# Patient Record
Sex: MALE | Race: WHITE | Employment: OTHER | URBAN - NONMETROPOLITAN AREA
[De-identification: names, ages, dates, MRNs, and addresses within clinical notes are randomized per-mention and may not be internally consistent; named-entity substitution may affect disease eponyms.]

---

## 2017-01-04 ENCOUNTER — TELEPHONE (OUTPATIENT)
Dept: CARDIOLOGY | Age: 62
End: 2017-01-04

## 2017-01-04 RX ORDER — AMIODARONE HYDROCHLORIDE 200 MG/1
200 TABLET ORAL DAILY
Qty: 30 TABLET | Refills: 6 | Status: CANCELLED | OUTPATIENT
Start: 2017-01-04

## 2017-01-06 RX ORDER — AMIODARONE HYDROCHLORIDE 200 MG/1
200 TABLET ORAL DAILY
Qty: 30 TABLET | Refills: 6 | Status: SHIPPED | OUTPATIENT
Start: 2017-01-06

## 2017-01-31 ENCOUNTER — OFFICE VISIT (OUTPATIENT)
Dept: CARDIOLOGY | Age: 62
End: 2017-01-31

## 2017-01-31 VITALS
SYSTOLIC BLOOD PRESSURE: 110 MMHG | HEART RATE: 71 BPM | WEIGHT: 179.8 LBS | HEIGHT: 71 IN | RESPIRATION RATE: 16 BRPM | BODY MASS INDEX: 25.17 KG/M2 | DIASTOLIC BLOOD PRESSURE: 68 MMHG

## 2017-01-31 DIAGNOSIS — I42.8 CARDIOMYOPATHY, NONISCHEMIC (HCC): ICD-10-CM

## 2017-01-31 DIAGNOSIS — I25.10 CORONARY ARTERY DISEASE INVOLVING NATIVE CORONARY ARTERY OF NATIVE HEART WITHOUT ANGINA PECTORIS: Primary | ICD-10-CM

## 2017-01-31 DIAGNOSIS — I48.0 PAF (PAROXYSMAL ATRIAL FIBRILLATION) (HCC): ICD-10-CM

## 2017-01-31 PROCEDURE — 93000 ELECTROCARDIOGRAM COMPLETE: CPT | Performed by: INTERNAL MEDICINE

## 2017-01-31 PROCEDURE — 99213 OFFICE O/P EST LOW 20 MIN: CPT | Performed by: INTERNAL MEDICINE

## 2017-02-15 ENCOUNTER — PROCEDURE VISIT (OUTPATIENT)
Dept: CARDIOLOGY | Age: 62
End: 2017-02-15

## 2017-02-15 DIAGNOSIS — Z95.0 CARDIAC PACEMAKER IN SITU: Primary | ICD-10-CM

## 2017-02-15 DIAGNOSIS — I49.5 SSS (SICK SINUS SYNDROME) (HCC): ICD-10-CM

## 2017-02-15 PROCEDURE — 93288 INTERROG EVL PM/LDLS PM IP: CPT | Performed by: INTERNAL MEDICINE

## 2017-04-20 ENCOUNTER — PROCEDURE VISIT (OUTPATIENT)
Dept: CARDIOLOGY CLINIC | Age: 62
End: 2017-04-20
Payer: MEDICARE

## 2017-04-20 DIAGNOSIS — I42.9 CARDIOMYOPATHY (HCC): Primary | ICD-10-CM

## 2017-04-20 LAB
LEFT VENTRICULAR EJECTION FRACTION HIGH VALUE: NORMAL %
LEFT VENTRICULAR EJECTION FRACTION MODE: NORMAL
LV EF: 30 %
LV EF: 33 %
LVEF MODALITY: NORMAL

## 2017-04-20 PROCEDURE — 93306 TTE W/DOPPLER COMPLETE: CPT | Performed by: INTERNAL MEDICINE

## 2017-04-21 ENCOUNTER — TELEPHONE (OUTPATIENT)
Dept: CARDIOLOGY | Age: 62
End: 2017-04-21

## 2017-04-21 DIAGNOSIS — I42.8 CARDIOMYOPATHY, NONISCHEMIC (HCC): ICD-10-CM

## 2017-04-24 DIAGNOSIS — I42.8 CARDIOMYOPATHY, NONISCHEMIC (HCC): Primary | ICD-10-CM

## 2017-05-01 DIAGNOSIS — I42.9 CARDIOMYOPATHY (HCC): Primary | ICD-10-CM

## 2017-08-11 DIAGNOSIS — E03.9 HYPOTHYROIDISM, UNSPECIFIED TYPE: ICD-10-CM

## 2017-08-14 RX ORDER — LEVOTHYROXINE SODIUM 0.07 MG/1
TABLET ORAL
Qty: 30 TABLET | Refills: 0 | Status: SHIPPED | OUTPATIENT
Start: 2017-08-14

## 2017-08-14 RX ORDER — LEVOTHYROXINE SODIUM 88 UG/1
88 TABLET ORAL DAILY
Qty: 30 TABLET | Refills: 0 | Status: SHIPPED | OUTPATIENT
Start: 2017-08-14

## 2024-02-24 ENCOUNTER — CLINICAL DOCUMENTATION (OUTPATIENT)
Age: 69
End: 2024-02-24

## 2024-03-08 ENCOUNTER — OFFICE VISIT (OUTPATIENT)
Age: 69
End: 2024-03-08

## 2024-03-08 VITALS
RESPIRATION RATE: 18 BRPM | WEIGHT: 204.8 LBS | HEART RATE: 86 BPM | SYSTOLIC BLOOD PRESSURE: 104 MMHG | DIASTOLIC BLOOD PRESSURE: 64 MMHG | HEIGHT: 69 IN | BODY MASS INDEX: 30.33 KG/M2

## 2024-03-08 DIAGNOSIS — R89.9 ABNORMAL LABORATORY TEST RESULT: ICD-10-CM

## 2024-03-08 DIAGNOSIS — E03.9 ACQUIRED HYPOTHYROIDISM: Primary | ICD-10-CM

## 2024-03-08 DIAGNOSIS — N18.9 CHRONIC KIDNEY DISEASE, UNSPECIFIED CKD STAGE: ICD-10-CM

## 2024-03-08 PROCEDURE — 99214 OFFICE O/P EST MOD 30 MIN: CPT | Performed by: INTERNAL MEDICINE

## 2024-03-08 PROCEDURE — 1123F ACP DISCUSS/DSCN MKR DOCD: CPT | Performed by: INTERNAL MEDICINE

## 2024-03-08 RX ORDER — RAMIPRIL 2.5 MG/1
CAPSULE ORAL
COMMUNITY
Start: 2024-02-14

## 2024-03-08 RX ORDER — LEVOTHYROXINE SODIUM 137 UG/1
TABLET ORAL
Qty: 100 TABLET | Refills: 1 | Status: SHIPPED | OUTPATIENT
Start: 2024-03-08

## 2024-03-08 NOTE — PROGRESS NOTES
(1 of 2) Never done    Respiratory Syncytial Virus (RSV) Pregnant or age 60 yrs+ (1 - 1-dose 60+ series) Never done    Diabetes screen  08/06/2018    Colorectal Cancer Screen  09/01/2019    AAA screen  01/14/2020    Lipids  07/19/2023    Flu vaccine (1) 08/01/2023    COVID-19 Vaccine (3 - 2023-24 season) 09/01/2023    Annual Wellness Visit (Medicare Advantage)  Never done    GFR test (Diabetes, CKD 3-4, OR last GFR 15-59)  03/02/2024    Pneumococcal 65+ years Vaccine  Completed    Hepatitis A vaccine  Aged Out    Hepatitis B vaccine  Aged Out    Hib vaccine  Aged Out    Polio vaccine  Aged Out    Meningococcal (ACWY) vaccine  Aged Out         Review of Systems    Constitutional: negative for chills and fevers  Eyes: negative for irritation, redness and visual disturbance  Respiratory: negative for cough, shortness of breath and wheezing  Cardiovascular: negative for chest pain, irregular heart beat and palpitations   The remainder of systems were reviewed and negative.       Objective:    /64 (Site: Left Upper Arm, Position: Sitting, Cuff Size: Medium Adult)   Pulse 86   Resp 18   Ht 1.753 m (5' 9\")   Wt 92.9 kg (204 lb 12.8 oz)   BMI 30.24 kg/m²   Body surface area is 2.13 meters squared.      Physical Exam   General: alert, appears stated age, cooperative and no distress   Eyes: negative findings: lids and lashes normal, conjunctivae and sclerae normal, corneas clear and pupils equal, round, reactive to light and accomodation  Neck:no adenopathy, no carotid bruit, no JVD and supple, symmetrical, trachea midline  Thyroid: There is no goiter or thyroid tenderness.  Lung:clear to auscultation bilaterally  Heart: regular rate and rhythm, S1, S2 normal, no murmur, click, rub or gallop and normal apical impulse  Abdomen:soft, non-tender; bowel sounds normal; no masses,  no organomegaly  Extremities:extremities normal, atraumatic, no cyanosis or edema and Homans sign is negative, no sign of DVT  Pulses:2+ and

## 2024-03-11 LAB
ANION GAP SERPL CALCULATED.3IONS-SCNC: 6 MMOL/L (ref 4–12)
BUN BLDV-MCNC: 19 MG/DL (ref 7–20)
CALCIUM SERPL-MCNC: 9.6 MG/DL (ref 8.8–10.5)
CHLORIDE BLD-SCNC: 103 MEQ/L (ref 101–111)
CO2: 29 MEQ/L (ref 21–32)
CREAT SERPL-MCNC: 1.65 MG/DL (ref 0.6–1.3)
CREATININE CLEARANCE: 42
GLUCOSE: 108 MG/DL (ref 70–110)
POTASSIUM SERPL-SCNC: 4.6 MEQ/L (ref 3.6–5)
SODIUM BLD-SCNC: 138 MEQ/L (ref 135–145)
T4 FREE: 1.07 NG/DL (ref 0.61–1.12)
TSH SERPL DL<=0.05 MIU/L-ACNC: 5.11 MCIU/ML (ref 0.49–4.67)

## 2024-03-15 LAB — MISCELLANEOUS LAB TEST ORDER: ABNORMAL

## 2024-03-17 ENCOUNTER — CLINICAL DOCUMENTATION (OUTPATIENT)
Age: 69
End: 2024-03-17

## 2024-10-07 DIAGNOSIS — E03.9 ACQUIRED HYPOTHYROIDISM: ICD-10-CM

## 2024-10-07 RX ORDER — LEVOTHYROXINE SODIUM 137 UG/1
TABLET ORAL
Qty: 100 TABLET | Refills: 0 | Status: SHIPPED | OUTPATIENT
Start: 2024-10-07 | End: 2024-10-10 | Stop reason: SDUPTHER

## 2024-10-07 RX ORDER — LEVOTHYROXINE SODIUM 137 UG/1
TABLET ORAL
Qty: 100 TABLET | Refills: 1 | Status: CANCELLED | OUTPATIENT
Start: 2024-10-07

## 2024-10-10 DIAGNOSIS — E03.9 ACQUIRED HYPOTHYROIDISM: ICD-10-CM

## 2024-10-10 RX ORDER — LEVOTHYROXINE SODIUM 137 UG/1
TABLET ORAL
Qty: 100 TABLET | Refills: 1 | Status: SHIPPED | OUTPATIENT
Start: 2024-10-10

## 2025-01-21 ENCOUNTER — OFFICE VISIT (OUTPATIENT)
Age: 70
End: 2025-01-21

## 2025-01-21 VITALS
HEIGHT: 69 IN | DIASTOLIC BLOOD PRESSURE: 80 MMHG | WEIGHT: 194.8 LBS | RESPIRATION RATE: 16 BRPM | HEART RATE: 54 BPM | SYSTOLIC BLOOD PRESSURE: 122 MMHG | BODY MASS INDEX: 28.85 KG/M2

## 2025-01-21 DIAGNOSIS — E03.9 ACQUIRED HYPOTHYROIDISM: Primary | ICD-10-CM

## 2025-01-21 DIAGNOSIS — N18.9 CHRONIC KIDNEY DISEASE, UNSPECIFIED CKD STAGE: ICD-10-CM

## 2025-01-21 DIAGNOSIS — R89.9 ABNORMAL LABORATORY TEST RESULT: ICD-10-CM

## 2025-01-21 LAB
ANION GAP SERPL CALCULATED.3IONS-SCNC: 10 MMOL/L (ref 4–12)
ANION GAP, EXTERNAL: 10
BUN BLDV-MCNC: 20 MG/DL (ref 7–20)
BUN, EXTERNAL: 20
BUN/CREATININE RATIO, EXTERNAL: ABNORMAL
CALCIUM SERPL-MCNC: 9.3 MG/DL (ref 8.8–10.5)
CALCIUM, EXTERNAL: 9.3
CHLORIDE BLD-SCNC: 105 MEQ/L (ref 101–111)
CHLORIDE, EXTERNAL: 105
CO2, EXTERNAL: 24
CO2: 24 MEQ/L (ref 21–32)
CREAT SERPL-MCNC: 1.46 MG/DL (ref 0.6–1.3)
CREATININE CLEARANCE: 48
CREATININE, EXTERNAL: 1.46
EGFR IF AFA, EXTERNAL: ABNORMAL
EGFR IF NONAFRICAN AMERICAN: 48
GLUCOSE SER, EXTERNAL: 117
GLUCOSE: 117 MG/DL (ref 70–110)
POTASSIUM SERPL-SCNC: 5 MEQ/L (ref 3.6–5)
POTASSIUM, EXTERNAL: 5
SODIUM BLD-SCNC: 139 MEQ/L (ref 135–145)
SODIUM, EXTERNAL: 139
T4 FREE: 1.23
T4 FREE: 1.23 NG/DL (ref 0.61–1.12)
TSH SERPL DL<=0.05 MIU/L-ACNC: 1 MCIU/ML (ref 0.49–4.67)
TSH SERPL DL<=0.05 MIU/L-ACNC: 1 UIU/ML

## 2025-01-21 PROCEDURE — 1123F ACP DISCUSS/DSCN MKR DOCD: CPT | Performed by: INTERNAL MEDICINE

## 2025-01-21 PROCEDURE — 1159F MED LIST DOCD IN RCRD: CPT | Performed by: INTERNAL MEDICINE

## 2025-01-21 PROCEDURE — 1160F RVW MEDS BY RX/DR IN RCRD: CPT | Performed by: INTERNAL MEDICINE

## 2025-01-21 PROCEDURE — 99214 OFFICE O/P EST MOD 30 MIN: CPT | Performed by: INTERNAL MEDICINE

## 2025-01-21 RX ORDER — LEVOTHYROXINE SODIUM 137 UG/1
TABLET ORAL
Qty: 100 TABLET | Refills: 1 | Status: SHIPPED | OUTPATIENT
Start: 2025-01-21

## 2025-01-21 NOTE — PROGRESS NOTES
[facility] [place] [facility]  [facility] ENDOCRINOLOGY  [address]  [place] [address]  Dept: [phone]  Loc: [phone]     Visit Date: 1/21/2025    Michael Jaime is a 70 y.o. male who presents today for:  Chief Complaint   Patient presents with    Follow-up            Subjective:      HPI     Michael Jaime is a 70 y.o. , male who comes for f/u for hypothyroidism.  PCP:Yessenia Carlson DO  This patient was diagnosed with hypothyroidism about 5 years ago.   Etiology of hypothyroidism is Unknown. Current therapy includes 137 mcg of synthroid daily for 6 days and 1.5 tablets once a week.  Patient has in the past tested positive for TSH alpha subunit although CT scan of the head did not show any pituitary lesions, however this was done without contrast.  The patient did not follow-up/recommended at the last visit due to family obligations.  He has not had recent thyroid levels.   Symptoms are as follows: Fatigue.   Patient reports intentional weight loss.  Patient reports he has been having leg cramps over the last month.  Patient also reports GI issues and back pain that are currently being followed by specialists.  Patient denies headaches or vision changes.      Past Medical History:   Diagnosis Date    Atrial fibrillation (HCC)     CAD (coronary artery disease)     Cardiomyopathy (HCC)     Echocardiogram abnormal 11/17/2016    EF 25%    HTN (hypertension)     Hyperlipidemia     Hypothyroidism     SSS (sick sinus syndrome) (HCC)     Symptomatic varicose veins     Tobacco abuse     Venous insufficiency     CHRONIC      Past Surgical History:   Procedure Laterality Date    CARDIAC CATHETERIZATION  12/01/2016    NML CORS EF 25%    CARDIAC PACEMAKER PLACEMENT  2009    COLONOSCOPY  09/01/2009    COLONOSCOPY  10/20/2016    sigmoid diverticulosis, hemorrhoids, no bx    HEMORRHOID SURGERY      OTHER SURGICAL HISTORY      laser ablation and stab phlebectomy 10/24/16    PACEMAKER PLACEMENT

## 2025-01-21 NOTE — PROGRESS NOTES
[facility] [place] [facility]  [facility] ENDOCRINOLOGY  [address]  [place] [address]  Dept: [phone]  Loc: [phone]     Visit Date: 1/20/2025    Michael Jaime is a 70 y.o. male who presents today for:  No chief complaint on file.           Subjective:      HPI     Michael Jaime is a 70 y.o. , male who comes for f/u for hypothyroidism.  PCP:Yessenia Carlson DO  This patient was diagnosed with hypothyroidism about 5 years ago.   Etiology of hypothyroidism is Unknown. Current therapy includes 137 mcg of synthroid daily for 6 days and 1.5 tablets once a week.  Patient has in the past tested positive for TSH alpha subunit although CT scan of the head did not show any pituitary lesions.  The patient did not follow-up/recommended at the last visit.  He has not had recent thyroid levels.   Symptoms are as follows: {APEX Hyperthyroidism:41346}.     Past Medical History:   Diagnosis Date    Atrial fibrillation (HCC)     CAD (coronary artery disease)     Cardiomyopathy (HCC)     Echocardiogram abnormal 11/17/2016    EF 25%    HTN (hypertension)     Hyperlipidemia     Hypothyroidism     SSS (sick sinus syndrome) (HCC)     Symptomatic varicose veins     Tobacco abuse     Venous insufficiency     CHRONIC      Past Surgical History:   Procedure Laterality Date    CARDIAC CATHETERIZATION  12/01/2016    NML CORS EF 25%    CARDIAC PACEMAKER PLACEMENT  2009    COLONOSCOPY  09/01/2009    COLONOSCOPY  10/20/2016    sigmoid diverticulosis, hemorrhoids, no bx    HEMORRHOID SURGERY      OTHER SURGICAL HISTORY      laser ablation and stab phlebectomy 10/24/16    PACEMAKER PLACEMENT         Family History   Problem Relation Age of Onset    Cancer Father         lung cancer    Colon Cancer Neg Hx      Social History     Tobacco Use    Smoking status: Every Day     Current packs/day: 0.25     Types: Cigarettes    Smokeless tobacco: Not on file   Substance Use Topics    Alcohol use: No      Current  Please review

## 2025-01-24 LAB — MISCELLANEOUS LAB TEST ORDER: ABNORMAL

## 2025-01-25 NOTE — RESULT ENCOUNTER NOTE
Michael Jaime  lab test(s) were abnormal.  We need to arrange pituitary imaging with contrast.  Please contact patient and document response.

## 2025-01-27 ENCOUNTER — TELEPHONE (OUTPATIENT)
Age: 70
End: 2025-01-27

## 2025-01-27 NOTE — TELEPHONE ENCOUNTER
----- Message from Dr. Maurilio Daly MD sent at 1/25/2025  2:19 PM EST -----  Michael Jaime  lab test(s) were abnormal.  We need to arrange pituitary imaging with contrast.  Please contact patient and document response.

## 2025-04-10 ENCOUNTER — CLINICAL DOCUMENTATION (OUTPATIENT)
Age: 70
End: 2025-04-10

## 2025-04-10 DIAGNOSIS — R89.9 ABNORMAL LABORATORY TEST RESULT: Primary | ICD-10-CM

## 2025-04-16 LAB
CREAT SERPL-MCNC: 1.41 MG/DL (ref 0.6–1.3)
CREATININE CLEARANCE: 50

## 2025-04-23 ENCOUNTER — OFFICE VISIT (OUTPATIENT)
Age: 70
End: 2025-04-23

## 2025-04-23 VITALS
HEIGHT: 69 IN | SYSTOLIC BLOOD PRESSURE: 104 MMHG | DIASTOLIC BLOOD PRESSURE: 72 MMHG | HEART RATE: 86 BPM | BODY MASS INDEX: 28.44 KG/M2 | WEIGHT: 192 LBS

## 2025-04-23 DIAGNOSIS — R89.9 ABNORMAL LABORATORY TEST RESULT: ICD-10-CM

## 2025-04-23 DIAGNOSIS — N18.9 CHRONIC KIDNEY DISEASE, UNSPECIFIED CKD STAGE: ICD-10-CM

## 2025-04-23 DIAGNOSIS — E03.9 ACQUIRED HYPOTHYROIDISM: Primary | ICD-10-CM

## 2025-04-23 PROCEDURE — 1160F RVW MEDS BY RX/DR IN RCRD: CPT | Performed by: INTERNAL MEDICINE

## 2025-04-23 PROCEDURE — 99214 OFFICE O/P EST MOD 30 MIN: CPT | Performed by: INTERNAL MEDICINE

## 2025-04-23 PROCEDURE — 1123F ACP DISCUSS/DSCN MKR DOCD: CPT | Performed by: INTERNAL MEDICINE

## 2025-04-23 PROCEDURE — 1159F MED LIST DOCD IN RCRD: CPT | Performed by: INTERNAL MEDICINE

## 2025-04-23 RX ORDER — METOPROLOL SUCCINATE 25 MG/1
25 TABLET, EXTENDED RELEASE ORAL DAILY
COMMUNITY
Start: 2025-04-01

## 2025-04-23 RX ORDER — LEVOTHYROXINE SODIUM 137 UG/1
TABLET ORAL
Qty: 100 TABLET | Refills: 1 | Status: SHIPPED | OUTPATIENT
Start: 2025-04-23

## 2025-04-23 NOTE — PROGRESS NOTES
Hypothyroidism Checklist   Name: Michael Jaime    YOB: 1955    Hypothyroidism Yes No   Cold Tendency  []  [x]    Constipation  []  [x]    Dry Skin  []  [x]    Brittle Hair  []  [x]    Depression  []  [x]    Fatigue/Low Energy  [x]  []    Memory Problems  []  [x]    Weight Gain  []  [x]

## 2025-04-23 NOTE — PROGRESS NOTES
[facility] [place] [facility]  [facility] ENDOCRINOLOGY  [address]  [address]  [place] [address]  Dept: [phone]  Loc: [phone]     Visit Date: 4/23/2025    Michael Jaime is a 70 y.o. male who presents today for:  Chief Complaint   Patient presents with    Follow-up     Acquired hypothyroidism.             Subjective:      HPI     Michael Jaime is a 70 y.o. , male who comes for f/u for hypothyroidism.  PCP:Yessenia Carlson DO  This patient was diagnosed with hypothyroidism about 5 years ago.   Etiology of hypothyroidism is Unknown. Current therapy includes 137 mcg of synthroid daily for 6 days and 1.5 tablets once a week.  This patient's thyroid labs have tested abnormal in the sense that either both TSH and free T4 have come back elevated or TSH has remained normal within the context of an elevated free T4.  He has had an elevated TSH alpha subunit level.  Previous imaging using noncontrasted CT did not show any lesions and we have been planning to obtain a contrasted study although there are concerns about his renal disease.  The patient denies headaches or any visual changes.   Current symptoms include Cold intolerance and Fatigue   Past Medical History:   Diagnosis Date    Atrial fibrillation (HCC)     CAD (coronary artery disease)     Cardiomyopathy     Echocardiogram abnormal 11/17/2016    EF 25%    HTN (hypertension)     Hyperlipidemia     Hypothyroidism     SSS (sick sinus syndrome) (HCC)     Symptomatic varicose veins     Tobacco abuse     Venous insufficiency     CHRONIC      Past Surgical History:   Procedure Laterality Date    CARDIAC CATHETERIZATION  12/01/2016    NML CORS EF 25%    CARDIAC PACEMAKER PLACEMENT  2009    COLONOSCOPY  09/01/2009    COLONOSCOPY  10/20/2016    sigmoid diverticulosis, hemorrhoids, no bx    HEMORRHOID SURGERY      OTHER SURGICAL HISTORY      laser ablation and stab phlebectomy 10/24/16    PACEMAKER PLACEMENT         Family History   Problem

## 2025-05-09 ENCOUNTER — CLINICAL DOCUMENTATION (OUTPATIENT)
Age: 70
End: 2025-05-09

## 2025-05-09 DIAGNOSIS — R89.9 ABNORMAL LABORATORY TEST RESULT: Primary | ICD-10-CM

## 2025-05-09 DIAGNOSIS — E03.9 ACQUIRED HYPOTHYROIDISM: ICD-10-CM

## 2025-05-09 NOTE — PROGRESS NOTES
I spoke with cardiologist regarding upcoming MRI.  Patient has a pacemaker which is not compatible with MRI based on FDA guidelines.  Therefore we will obtain head CT instead without and with contrast.  This was discussed with patient's nephrologist Dr. Hutchinson.  She recommends giving patient normal saline at 100 mL an hour for 3 hours immediately before the procedure and then getting a BMP 1 week later and forwarding it to her.  This has been ordered.  I called to notify patient and he did not .  Please let him know.

## 2025-05-13 ENCOUNTER — CLINICAL DOCUMENTATION (OUTPATIENT)
Age: 70
End: 2025-05-13

## 2025-05-13 NOTE — PROGRESS NOTES
I spoke with patient regarding CT scan.  We discussed prestudy hydration and labs to be done 1 week after the CT scan to evaluate renal status, which labs will need to be forwarded to her nephrologist Dr. Corrales at [facility].  Please coordinate.

## 2025-05-14 ENCOUNTER — TELEPHONE (OUTPATIENT)
Dept: ADMINISTRATIVE | Age: 70
End: 2025-05-14

## 2025-05-14 NOTE — TELEPHONE ENCOUNTER
Pt is calling and states he needs an order for the imaging he needs done sent to [facility] so he can have completed.  Please advise pt at [phone]

## 2025-06-07 ENCOUNTER — RESULTS FOLLOW-UP (OUTPATIENT)
Age: 70
End: 2025-06-07

## 2025-06-07 ENCOUNTER — CLINICAL DOCUMENTATION (OUTPATIENT)
Age: 70
End: 2025-06-07

## 2025-06-07 NOTE — RESULT ENCOUNTER NOTE
Michael Jaime  lab test(s) were abnormal.  Pituitary MRI shows no lesion.  Keep existing appointment.  Please contact patient and document response.

## 2025-06-09 NOTE — TELEPHONE ENCOUNTER
----- Message from Dr. Maurilio Daly MD sent at 6/7/2025  1:18 PM EDT -----  Michael Jaime  lab test(s) were abnormal.  Pituitary MRI shows no lesion.  Keep existing appointment.  Please contact patient and document response.

## 2025-07-17 LAB
ANION GAP SERPL CALCULATED.3IONS-SCNC: 7 MMOL/L (ref 4–12)
BUN BLDV-MCNC: 16 MG/DL
BUN BLDV-MCNC: 16 MG/DL (ref 7–20)
CALCIUM SERPL-MCNC: 9.1 MG/DL
CALCIUM SERPL-MCNC: 9.1 MG/DL (ref 8.8–10.5)
CHLORIDE BLD-SCNC: 105 MEQ/L (ref 101–111)
CHLORIDE BLD-SCNC: 105 MMOL/L
CO2: 27 MEQ/L (ref 21–32)
CO2: 27 MMOL/L
CREAT SERPL-MCNC: 1.64 MG/DL
CREAT SERPL-MCNC: 1.64 MG/DL (ref 0.6–1.3)
CREATININE CLEARANCE: 42
EGFR: 42
GLUCOSE BLD-MCNC: 120 MG/DL
GLUCOSE: 120 MG/DL (ref 70–110)
POTASSIUM SERPL-SCNC: 4.1 MEQ/L (ref 3.6–5)
POTASSIUM SERPL-SCNC: 4.1 MMOL/L
SODIUM BLD-SCNC: 139 MEQ/L (ref 135–145)
SODIUM BLD-SCNC: 139 MMOL/L
T4 FREE: 1.03
T4 FREE: 1.03 NG/DL (ref 0.61–1.12)
TSH SERPL DL<=0.05 MIU/L-ACNC: 1.15 MCIU/ML (ref 0.49–4.67)
TSH SERPL DL<=0.05 MIU/L-ACNC: 1.15 UIU/ML

## 2025-07-24 ENCOUNTER — OFFICE VISIT (OUTPATIENT)
Age: 70
End: 2025-07-24
Payer: MEDICARE

## 2025-07-24 VITALS
BODY MASS INDEX: 28.76 KG/M2 | WEIGHT: 194.19 LBS | HEIGHT: 69 IN | HEART RATE: 51 BPM | SYSTOLIC BLOOD PRESSURE: 94 MMHG | DIASTOLIC BLOOD PRESSURE: 62 MMHG

## 2025-07-24 DIAGNOSIS — R89.9 ABNORMAL LABORATORY TEST RESULT: Primary | ICD-10-CM

## 2025-07-24 DIAGNOSIS — E03.9 ACQUIRED HYPOTHYROIDISM: ICD-10-CM

## 2025-07-24 PROCEDURE — 1160F RVW MEDS BY RX/DR IN RCRD: CPT | Performed by: INTERNAL MEDICINE

## 2025-07-24 PROCEDURE — 99214 OFFICE O/P EST MOD 30 MIN: CPT | Performed by: INTERNAL MEDICINE

## 2025-07-24 PROCEDURE — 4004F PT TOBACCO SCREEN RCVD TLK: CPT | Performed by: INTERNAL MEDICINE

## 2025-07-24 PROCEDURE — G8427 DOCREV CUR MEDS BY ELIG CLIN: HCPCS | Performed by: INTERNAL MEDICINE

## 2025-07-24 PROCEDURE — 3017F COLORECTAL CA SCREEN DOC REV: CPT | Performed by: INTERNAL MEDICINE

## 2025-07-24 PROCEDURE — 1123F ACP DISCUSS/DSCN MKR DOCD: CPT | Performed by: INTERNAL MEDICINE

## 2025-07-24 PROCEDURE — G8419 CALC BMI OUT NRM PARAM NOF/U: HCPCS | Performed by: INTERNAL MEDICINE

## 2025-07-24 PROCEDURE — 1159F MED LIST DOCD IN RCRD: CPT | Performed by: INTERNAL MEDICINE

## 2025-07-24 RX ORDER — LEVOTHYROXINE SODIUM 137 UG/1
TABLET ORAL
Qty: 100 TABLET | Refills: 1 | Status: SHIPPED | OUTPATIENT
Start: 2025-07-24

## 2025-07-24 NOTE — PROGRESS NOTES
auscultation.  Extremities: Varicose veins present on the right leg.  Skin: Warm and dry, no rash.  Neurological: Normal.     General: alert, appears stated age, cooperative and no distress   Eyes: negative findings: lids and lashes normal, conjunctivae and sclerae normal, corneas clear and pupils equal, round, reactive to light and accomodation  Neck:no adenopathy, no carotid bruit, no JVD and supple, symmetrical, trachea midline  Thyroid: There is no goiter or thyroid tenderness.  Lung:clear to auscultation bilaterally  Heart: regular rate and rhythm, S1, S2 normal, no murmur, click, rub or gallop and normal apical impulse  Abdomen:soft, non-tender; bowel sounds normal; no masses,  no organomegaly  Extremities:extremities normal, atraumatic, no cyanosis or edema and Homans sign is negative, no sign of DVT  Pulses:2+ and symmetric  Skin:warm and dry, no hyperpigmentation, vitiligo, or suspicious lesions  Neuro:normal without focal findings, mental status, speech normal, alert and oriented x3, PERFECTO, cranial nerves 2-12 intact, muscle tone and strength normal and symmetric.  Lymph nodes: There is no cervical, supraclavicular or submental adenopathy.  Musculoskeletal:  No joint swelling or deformity.  Psychiatry: Alert and oriented ×3.  Making good eye contact.  Affect is normal.        Assessment/Plan:  Assessment & Plan  1. Hypothyroidism: Stable  - Thyroid function tests within normal limits  - TSH level: 1.15 (current)  - Free T4: normal (current)  - Discussed normal thyroid levels and confirmed current dose of levothyroxine is effective  - Renewed prescription for levothyroxine 137 mcg for 6 months    2. Cardiac arrhythmia  - Confirmed patient is under the care of cardiologist Dr. Baumann  - Patient is taking amiodarone (Cordarone)    3. Leg pain  - Experiences crampy pain in legs after sitting for 10-15 minutes, resolves upon walking  - Physical exam revealed varicose veins on right leg  - Advised to discuss